# Patient Record
Sex: FEMALE | Race: AMERICAN INDIAN OR ALASKA NATIVE | Employment: OTHER | URBAN - METROPOLITAN AREA
[De-identification: names, ages, dates, MRNs, and addresses within clinical notes are randomized per-mention and may not be internally consistent; named-entity substitution may affect disease eponyms.]

---

## 2018-12-06 PROBLEM — R31.29 OTHER MICROSCOPIC HEMATURIA: Status: ACTIVE | Noted: 2018-12-06

## 2019-06-20 PROBLEM — E55.9 VITAMIN D DEFICIENCY: Status: ACTIVE | Noted: 2017-06-01

## 2019-06-20 PROBLEM — H25.13 AGE-RELATED NUCLEAR CATARACT OF BOTH EYES: Status: ACTIVE | Noted: 2017-11-29

## 2019-06-20 PROBLEM — H01.02B SQUAMOUS BLEPHARITIS OF BOTH UPPER AND LOWER EYELID OF LEFT EYE: Status: ACTIVE | Noted: 2017-07-24

## 2019-06-20 PROBLEM — Z96.1 PSEUDOPHAKIA: Status: ACTIVE | Noted: 2018-08-31

## 2019-06-20 PROBLEM — K76.0 HEPATIC STEATOSIS: Status: ACTIVE | Noted: 2019-05-30

## 2019-06-20 PROBLEM — G56.01 CARPAL TUNNEL SYNDROME OF RIGHT WRIST: Status: ACTIVE | Noted: 2017-07-20

## 2019-06-20 PROBLEM — H00.14 CHALAZION OF LEFT UPPER EYELID: Status: ACTIVE | Noted: 2017-11-29

## 2019-06-20 PROBLEM — M65.341 TRIGGER RING FINGER OF RIGHT HAND: Status: ACTIVE | Noted: 2017-05-04

## 2019-06-20 PROBLEM — H25.11 AGE-RELATED NUCLEAR CATARACT OF RIGHT EYE: Status: ACTIVE | Noted: 2018-08-22

## 2019-06-20 PROBLEM — H25.12 NUCLEAR SCLEROTIC CATARACT OF LEFT EYE: Status: ACTIVE | Noted: 2018-08-22

## 2019-06-20 PROBLEM — D72.829 LEUKOCYTOSIS: Status: ACTIVE | Noted: 2018-08-04

## 2020-06-23 ENCOUNTER — HOSPITAL ENCOUNTER (OUTPATIENT)
Dept: LAB | Age: 75
Discharge: HOME | End: 2020-06-23
Payer: MEDICARE

## 2020-06-23 LAB
HCT VFR BLD AUTO: 38.1 % (ref 35–45)
HGB BLD-MCNC: 12.2 G/DL (ref 12–16)
POTASSIUM SERPL-SCNC: 3.4 MMOL/L (ref 3.5–5.5)

## 2020-06-23 PROCEDURE — 85018 HEMOGLOBIN: CPT

## 2020-06-23 PROCEDURE — 36415 COLL VENOUS BLD VENIPUNCTURE: CPT

## 2020-06-23 PROCEDURE — 84132 ASSAY OF SERUM POTASSIUM: CPT

## 2022-03-14 ENCOUNTER — HOME HEALTH ADMISSION (OUTPATIENT)
Dept: HOME HEALTH SERVICES | Facility: HOME HEALTH | Age: 77
End: 2022-03-14
Payer: MEDICARE

## 2022-03-15 ENCOUNTER — HOME CARE VISIT (OUTPATIENT)
Dept: SCHEDULING | Facility: HOME HEALTH | Age: 77
End: 2022-03-15
Payer: MEDICARE

## 2022-03-15 ENCOUNTER — HOME CARE VISIT (OUTPATIENT)
Dept: HOME HEALTH SERVICES | Facility: HOME HEALTH | Age: 77
End: 2022-03-15
Payer: MEDICARE

## 2022-03-15 PROCEDURE — 3331090001 HH PPS REVENUE CREDIT

## 2022-03-15 PROCEDURE — 400018 HH-NO PAY CLAIM PROCEDURE

## 2022-03-15 PROCEDURE — 3331090002 HH PPS REVENUE DEBIT

## 2022-03-15 PROCEDURE — G0162 HHC RN E&M PLAN SVS, 15 MIN: HCPCS

## 2022-03-15 PROCEDURE — 400013 HH SOC

## 2022-03-15 PROCEDURE — G0299 HHS/HOSPICE OF RN EA 15 MIN: HCPCS

## 2022-03-16 ENCOUNTER — HOME CARE VISIT (OUTPATIENT)
Dept: HOME HEALTH SERVICES | Facility: HOME HEALTH | Age: 77
End: 2022-03-16
Payer: MEDICARE

## 2022-03-16 PROCEDURE — 3331090002 HH PPS REVENUE DEBIT

## 2022-03-16 PROCEDURE — 3331090001 HH PPS REVENUE CREDIT

## 2022-03-17 ENCOUNTER — HOME CARE VISIT (OUTPATIENT)
Dept: SCHEDULING | Facility: HOME HEALTH | Age: 77
End: 2022-03-17
Payer: MEDICARE

## 2022-03-17 ENCOUNTER — HOME CARE VISIT (OUTPATIENT)
Dept: HOME HEALTH SERVICES | Facility: HOME HEALTH | Age: 77
End: 2022-03-17
Payer: MEDICARE

## 2022-03-17 PROCEDURE — 3331090001 HH PPS REVENUE CREDIT

## 2022-03-17 PROCEDURE — 3331090002 HH PPS REVENUE DEBIT

## 2022-03-17 PROCEDURE — G0299 HHS/HOSPICE OF RN EA 15 MIN: HCPCS

## 2022-03-18 VITALS
RESPIRATION RATE: 18 BRPM | OXYGEN SATURATION: 98 % | SYSTOLIC BLOOD PRESSURE: 124 MMHG | TEMPERATURE: 98.3 F | HEART RATE: 77 BPM | DIASTOLIC BLOOD PRESSURE: 60 MMHG

## 2022-03-18 PROBLEM — E55.9 VITAMIN D DEFICIENCY: Status: ACTIVE | Noted: 2017-06-01

## 2022-03-18 PROBLEM — H25.12 NUCLEAR SCLEROTIC CATARACT OF LEFT EYE: Status: ACTIVE | Noted: 2018-08-22

## 2022-03-18 PROBLEM — H25.11 AGE-RELATED NUCLEAR CATARACT OF RIGHT EYE: Status: ACTIVE | Noted: 2018-08-22

## 2022-03-18 PROCEDURE — 3331090001 HH PPS REVENUE CREDIT

## 2022-03-18 PROCEDURE — 3331090002 HH PPS REVENUE DEBIT

## 2022-03-18 NOTE — HOME HEALTH
Skilled services/Home bound verification:     Skilled Reason for admission/summary of clinical condition:  S/P pericardial effusion . This patient is homebound for the following reasons Requires considerable and taxing effort to leave the home , Requires the assistance of 1 or more persons to leave the home  and Only leaves the home for medical reasons or [employer] services and are infrequent and of short duration for other reasons . Caregiver: relative. Caregiver assists with ADL's, house keeping, meal prep. Medications reconciled and all medications are available in the home this visit. The following education was provided regarding medications: All medications reconciled, taught common uses and side effects  Medications  are effective at this time. High risk medication teaching regarding anticoagulants, hyperglycemic agents or opiod narcotics performed (specify) Eliquis:  Instruct patient to tell physicians and dentists that they are taking the drug before elective surgery or invasive procedures and before any new drug is prescribed .  patient to report if they are pregnant or breastfeeding or plan to become pregnant or breastfeed while on drug . Side effects may include nausea, contusion, anemia, syncope, and hemorrhage . Instruct patient to immediately report signs of blood loss or unusual bleeding . Tell patient to immediately report signs of spinal or epidural hematomas (ie, numbness or weakness of legs or bowel or bladder dysfunction) . Advise patient against sudden discontinuation of drug . Advise patient that there are multiple significant drug-drug interactions for this drug. Consult healthcare professional prior to new drug use (including over-the-counter and herbal drugs) .      ,    NO discrepancies/look a like medications/medication interactions     Home health supplies by type and quantity ordered/delivered this visit include: None this visit    Patient education provided this visit to include: [facility] orientation, medications, fall risks, pressure ulcer prevention. Patient level of understanding of education provided: patient acknowledges education    Sharps Education Provided: n/a  Patient response to procedure performed:  Patient was comfortable with entire [facility] visit. No increased discomfort    Home exercise program/Homework provided: Patient will participate with PT/OT    Pt/Caregiver instructed on plan of care and are agreeable to plan of care at this time. Physician Dr. Adonay Torres notified of patient admission to home health and plan of care including anticipated frequency of 2w1, 1w3 and treatments/interventions/modalities of SN. Discharge planning discussed with patient and caregiver. Discharge planning as follows: Will d/c to home/self care when goals are met and patient is stable  . Pt/Caregiver did verbalize understanding of discharge planning. Next MD appointment 3/16/22 (date) with Dr. Adonay Torres. Patient/caregiver encouraged/instructed to keep appointment as lack of follow through with physician appointment could result in discontinuation of home care services for non-compliance.

## 2022-03-19 PROBLEM — Z96.1 PSEUDOPHAKIA: Status: ACTIVE | Noted: 2018-08-31

## 2022-03-19 PROBLEM — H00.14 CHALAZION OF LEFT UPPER EYELID: Status: ACTIVE | Noted: 2017-11-29

## 2022-03-19 PROBLEM — H25.13 AGE-RELATED NUCLEAR CATARACT OF BOTH EYES: Status: ACTIVE | Noted: 2017-11-29

## 2022-03-19 PROBLEM — K76.0 HEPATIC STEATOSIS: Status: ACTIVE | Noted: 2019-05-30

## 2022-03-19 PROBLEM — D72.829 LEUKOCYTOSIS: Status: ACTIVE | Noted: 2018-08-04

## 2022-03-19 PROBLEM — R31.29 OTHER MICROSCOPIC HEMATURIA: Status: ACTIVE | Noted: 2018-12-06

## 2022-03-19 PROBLEM — G56.01 CARPAL TUNNEL SYNDROME OF RIGHT WRIST: Status: ACTIVE | Noted: 2017-07-20

## 2022-03-19 PROBLEM — M65.341 TRIGGER RING FINGER OF RIGHT HAND: Status: ACTIVE | Noted: 2017-05-04

## 2022-03-19 PROBLEM — H01.02B SQUAMOUS BLEPHARITIS OF BOTH UPPER AND LOWER EYELID OF LEFT EYE: Status: ACTIVE | Noted: 2017-07-24

## 2022-03-19 PROCEDURE — 3331090002 HH PPS REVENUE DEBIT

## 2022-03-19 PROCEDURE — 3331090001 HH PPS REVENUE CREDIT

## 2022-03-20 ENCOUNTER — HOME CARE VISIT (OUTPATIENT)
Dept: SCHEDULING | Facility: HOME HEALTH | Age: 77
End: 2022-03-20
Payer: MEDICARE

## 2022-03-20 VITALS
SYSTOLIC BLOOD PRESSURE: 130 MMHG | RESPIRATION RATE: 16 BRPM | OXYGEN SATURATION: 97 % | DIASTOLIC BLOOD PRESSURE: 64 MMHG | TEMPERATURE: 97.9 F | HEART RATE: 74 BPM

## 2022-03-20 PROCEDURE — 3331090001 HH PPS REVENUE CREDIT

## 2022-03-20 PROCEDURE — G0151 HHCP-SERV OF PT,EA 15 MIN: HCPCS

## 2022-03-20 PROCEDURE — 3331090002 HH PPS REVENUE DEBIT

## 2022-03-20 NOTE — HOME HEALTH
S: Patient stated she was very tired and also frustrated at her  for not helping more and being depressed all the time. O: PAIN: Patient reports chronic B knee pain due to arthritis and chronic angina that is worse with stress from her . WOUND:no open wounds noted or reported. ROM: no impairments noted   STRENGTH:Patient demonstrates decreased muscle strength as follows:  R hip flex 3/5  R hip abd 3/5  R hip add 3/5  R hip ext 3/5  R knee flex 3-/5  R knee ext 3-/5  R ankle DF 3/5  L hip flex 3/5  L hip abd 3/5  L hip ext 3/5  L hip add 3/5  L knee flex 3-/5  L knee ext 3-/5  L ankle DF 3/5  FTSTS failed   BED MOBILITY:  Min A   EQUIPMENT:lift chair, 4WW, quad cane, raised commode, shower chair and pt states she has been looking to get a WC ramp. TRANSFERS: patient requires Mod A from lifted chair and uses BUE to push up and significant support of back of legs on chair for support and balance upon standing, has a wide base of support and requires AD for initial standing balance. GAIT: patient ambulating 25 ft with Min A using a [id number]. Patient demonstrates the following gait deficits: slow unsteady gait with decreased foot clearane and step length BLE . Patient requires VC's for standing for a few seconds before walking to decrease fall risk to improve safety and decrease risk of falling. STEPS: there are/is 4 to enter home. Steps not assessed this visit and pt reports significant difficulty with steps and the need for a WC ramp. BALANCE: Tinetti 6/28 and TUG (failed) seconds - high fall risk due to reduced strength, gait deviations and decreased efficiency of balance reactions and pain. Patient demonstrates poor use and activation of ankle and hip strategy during standing balance testing and activities.     A:ASSESSMENT AND PROGRESS TOWARD GOALS:  Patient demonstrated a positive result to therapy this date as evidenced by an improvement in gait pattern with cues, an understanding of her fall risk and agreement to not walk alone,  and patient expressing an understanding of the rehab plan due to therapy verbal and written instructions. Goals established for increased independence in the home, safe mobility in the home, improvement in strength and balance all designed to reduce fall risk and progress toward independence. Patient will benefit from continued PT intervention to progress toward meeting all established goals     P:3W1, 2W3, 1W1      PMH/Summary of clinical condition: Silvia Gupta is a 78 y.o. female referred s/p hospitalization and 2 week rehab stay for pericardial effusion. PMH: A-fib, hypothyroidism, GERD, carpal tunnel syndrome R wrist, chalazion of L upper eyelid, HTN, hepatic stenosis, ischemic heat disease, leukocytosis, asthma, cataract, obesity, psoriasis, psoriatic arthritis, unstable angina pectoris and Vit D deficiency. Medications reconciled. No changes in medications at this time. Medications are effective at this time. Social history/ caregivers: lives with her  in a one level home with 4 steps to enter. She requires assistance from her  for ADL's, IADL's, medication management and transportation. Pt/Caregiver instructed on plan of care and are agreeable to plan of care at this time. Plan of care and admission to home health status reported to attending Gatito Castro MD   Discharge planning discussed with patient and caregiver. Discharge planning as follows: DC to self and family under MD supervision when all goals are met or maximum potential is reached  Pt/Caregiver did verbalize understanding.   Patient education provided this visit: safety, fall risk, HEP, need for assistance at all times with transfers and ambulation  Home exercise program: 1. always have someone with her for assistance when transferring or ambulating   2. stand and walk short distances every hour during the day to increase strength, provide pressure relief and increase independence with transfers  3. Patient/CG instructed in a  HEP as follows: CHF protocol initiated: pursed lipped breathing x 5 reps every hour, ankle pumps x 10 reps every hour and LAQ x 10 2 times daily. Patient's response to treatment: Patient reported knee pain with LAQ and was instructed to do as many as she could without increased knee pain as part of her daily HEP. Patient's response to education provided:  Patient expressed understanding of importance of using her AD at all times to decrease fall risk. Continued need for the following skills: MD referral for HHPT following recent hospital stay. HHPT is medically necessary to address  dx and clinical findings: decreased ROM, decreased strength, impaired gait, decreased ability w stair negotiation, increased swelling, decreased transfer status, decreased endurance, decreased balance and decreased safety, increased pain in order to improve functional mobility/quality of life, decrease burden of care, reduce risk for re-hospitalization, work towards patient's personal goals of return to PLOF w decrease risk for falls.

## 2022-03-20 NOTE — Clinical Note
Pacheco Patrick is a 78 y.o. female referred s/p hospitalization and 2 week rehab stay for pericardial effusion. PMH: A-fib, hypothyroidism, GERD, carpal tunnel syndrome R wrist, chalazion of L upper eyelid, HTN, hepatic stenosis, ischemic heat disease, leukocytosis, asthma, cataract, obesity, psoriasis, psoriatic arthritis, unstable angina pectoris and Vit D deficiency. Plan: 3W1, 2W3, 1W1.    Therapy Functional Score Assessment  Question   Score   Grooming  2       Upper Dressing 2      Lower Dressing 3      Bathing  5      Toilet Transfer  1    Transfer  2            Ambulation  3   Dyspnea                     4       Pain Interfering with activity 3  Est number therapy visits      10

## 2022-03-21 VITALS
TEMPERATURE: 97.4 F | DIASTOLIC BLOOD PRESSURE: 50 MMHG | RESPIRATION RATE: 18 BRPM | HEART RATE: 97 BPM | OXYGEN SATURATION: 98 % | SYSTOLIC BLOOD PRESSURE: 110 MMHG

## 2022-03-21 PROCEDURE — 3331090002 HH PPS REVENUE DEBIT

## 2022-03-21 PROCEDURE — 3331090001 HH PPS REVENUE CREDIT

## 2022-03-21 NOTE — HOME HEALTH
Skilled reason for visit: Assessment, vitals, education    Caregiver involvement: assists with ADl's, housekeeping, medciations, transportations. Medications reviewed and all medications are available in the home this visit. The following education was provided regarding medications:    Lasix:   Drug causes sun-sensitivity. Advise patient to use sunscreen and avoid tanning beds. Patient should avoid activities requiring coordination until drug effects are realized, as drug may cause dizziness, vertigo, or blurred vision. This drug may cause hyperglycemia, hyperuricemia, constipation, diarrhea, loss of appetite, nausea, vomiting, purpuric disorder, cramps, spasticity, asthenia, headache, paresthesia, or scaling eczema. Instruct patient to report unusual bleeding/bruising or signs/symptoms of hypotension, infection, pancreatitis, or ototoxicity (tinnitus, hearing impairment). Advise patient to report signs/symptoms of a severe skin reactions (flu-like symptoms, spreading red rash, or skin/mucous membrane blistering) or erythema multiforme. Instruct patient to eat high-potassium foods during drug therapy, as directed by healthcare professional.   Patient should not drink alcohol while taking this drug. .    MD notified of any discrepancies/look a-like medications/medication interactions: none  Medications are effective at this time.       Home health supplies by type and quantity ordered/delivered this visit include: None this visit    Patient education provided this visit:what to watch for for cellulitis    Sharps education provided: N/A    Patient level of understanding of education provided: [address] Performed this visit: Assessment, education    Patient response to procedure performed: with no increased difficulties    Agency Progress toward goals: progressing    Patient's Progress towards personal goals: progressing    Home exercise program: particpates    Continued need for the following skills: Nursing    Plan for next visit: Assesment, vitals, education    Patient and/or caregiver notified and agrees to changes in the Plan of Care YES      The following discharge planning was discussed with the pt/caregiver:  Will d/c to home/self care when goals are met and patient is stable

## 2022-03-22 ENCOUNTER — HOME CARE VISIT (OUTPATIENT)
Dept: SCHEDULING | Facility: HOME HEALTH | Age: 77
End: 2022-03-22
Payer: MEDICARE

## 2022-03-22 ENCOUNTER — HOME CARE VISIT (OUTPATIENT)
Dept: HOME HEALTH SERVICES | Facility: HOME HEALTH | Age: 77
End: 2022-03-22
Payer: MEDICARE

## 2022-03-22 VITALS
RESPIRATION RATE: 16 BRPM | OXYGEN SATURATION: 97 % | HEART RATE: 87 BPM | TEMPERATURE: 98.2 F | DIASTOLIC BLOOD PRESSURE: 73 MMHG | SYSTOLIC BLOOD PRESSURE: 120 MMHG

## 2022-03-22 VITALS
HEART RATE: 60 BPM | SYSTOLIC BLOOD PRESSURE: 117 MMHG | TEMPERATURE: 96.8 F | OXYGEN SATURATION: 98 % | DIASTOLIC BLOOD PRESSURE: 72 MMHG

## 2022-03-22 PROCEDURE — 3331090001 HH PPS REVENUE CREDIT

## 2022-03-22 PROCEDURE — 3331090002 HH PPS REVENUE DEBIT

## 2022-03-22 PROCEDURE — G0152 HHCP-SERV OF OT,EA 15 MIN: HCPCS

## 2022-03-22 PROCEDURE — G0157 HHC PT ASSISTANT EA 15: HCPCS

## 2022-03-22 NOTE — HOME HEALTH
Summary of clinical condition:   Came in with chest pain and was [facility] and her is summary from then 78year old female with atrial fibrillation who underwent an atrial fibri llation ablation on 8/67, complicated by a large pericardial effusion. She was transferred to [facility] for management. Cardiology performed urgent pericardial drainage. She was in shock and underwent emergency pericardiocentesis. This was not successful and was complicated by RV perforation. She subsequently was seen by CTS and underwent an emergency window procedure. The cause of her pericardial effusion is not totally clear. She had undergone a flutter ablation several days previously, and it was suspected of being a complication. However the pericardial fluid was largely serous which argued against perforation during the original ablation. She developed afib again, despite ablation. Plans were initially made for cardioversion. Because she had been taken off anticoagulation for a period for surgery, a DEWEY was completed first.  Resu lts are below but showed thrombus in the Left atrial appendage. For additional rate control, EP added Dig and she is now on her maintenance dose of .25 daily. Patient was DC [facility] to Rehab on 2/27/2022 but returned to hospital the next day with chest pain. She returned back to Rehab the next day and received OT and PT. She improved and was discharged home on 3/15/2022 with orders for [facility] OT. Medical History: Chronic bilateral knee pain from OA, Left shoulder surgery, Cardiovascular,  Essential hypertension, Chronic deep vein thrombosis (DVT) of femoral vein of left lower extremity ([address] Unstable angina pectoris ([address] Ischemic heart disease, Paroxysmal atrial fibrillation ([address] Stress-induced cardiomyopathy, Bilateral carpel tunnel syndrome. Medications review completed. No adverse reactions noted for.   Pt denied any medication changes since admission. Caregiver involvement: Pt's  assists with dressing, bathing, and cooking. Patient education provided this visit:  Patient was educated about the difference between OT in rehab and in [facility]. Patient Response to Education:  Pt was able to verbalize the difference in OT. Home exercise program:  Pt was trained in HEP including 2 hands clasped and touch neck and bilateral shoulder flexion x 10. She was instructed to perform daily. ASSESSMENT:  Pt has needed assistance for dressing and showering for some time. She does not have or know how to use AE for ADLs. She was instructed in types of AE she needs but she would like to try them with OT before buying them. Pt needs assistance for bed and shower transfers. She has a bed rail and a tub transfer bench but needs further training to be independent. Pt is unable to cook or do her laundry. Pt has decreased UE strength and ROM for ADLs. Pt will be seen for there ex, ADL training, AE training, IADL training, and transfer training. Patient Verbalized Goals:  Improve dressing, showering, and cooking. Patient Response to Evaluation:  Pt reported being tired after evaluation but was not SOB. Continued need for the following skills: Occupational Therapy  Pt/Caregiver instructed on plan of care and are agreeable to plan of care at this time. Discharge planning discussed with patient and caregiver. Discharge planning as follows: Pt will be seen 2 x week x 3 then discharge when goals are met. Scott Nevarez Pt/Caregiver did verbalize understanding.

## 2022-03-22 NOTE — Clinical Note
Patient has long standing ADL problems that can be resolved with AE. She has transfer problems that can be resolved with proper training with AE. She has a leg  and tub bench but does not use them properly. She gets too tired when trying to cook so teach her energy conservation in the kitchen. Any questions just call.

## 2022-03-22 NOTE — HOME HEALTH
SUBJECTIVE: Patient reporting she is bleeding from her vagina in \"blotches\" and this has been happening since patient has been at [facility], [facility] stopped when she left [facility] and started up again recently. Patient has been advised by PCP to get appt with     CAREGIVER INVOLVEMENT/ASSISTANCE NEEDED FOR: patient's  drives and patient has meals on wheels delivered. HOME HEALTH SUPPLIES BY TYPE AND QUANTITY ORDERED/DELIVERED THIS VISIT INCLUDE: NA    OBJECTIVE:  See interventions. PATIENT RESPONSE TO TREATMENT: Patient able to demonstrate therexs as instructed this treatment with some complaints of increased B knee pain. ASSESSMENT OF PROGRESS TOWARD GOALS: Patient progressing towards goals as previously established in POC with home health physical therapy at this time. No noted signs/symptoms of infection today incision site on chest is well approximated and healed. Displayed improving gait form overall using 4ww in the home with cueing to increase trunk extension. Patient was able to complete new standing exercises with some mild fatigue but no reports of increased pain. Plan:  Discharge planning discussed at this visit regarding eventual discharge to outpatient PT services once patient has met goals and met maximum benefit from home health skilled PT services. Continue towards goals per POC as previously established. Continued need for skilled home health PT at this time to address deficits, reduce risk of falls, and obtain goals as previously established per POC.  Continue with outside gait training as per patient request.

## 2022-03-23 ENCOUNTER — HOME CARE VISIT (OUTPATIENT)
Dept: SCHEDULING | Facility: HOME HEALTH | Age: 77
End: 2022-03-23
Payer: MEDICARE

## 2022-03-23 ENCOUNTER — HOME CARE VISIT (OUTPATIENT)
Dept: HOME HEALTH SERVICES | Facility: HOME HEALTH | Age: 77
End: 2022-03-23
Payer: MEDICARE

## 2022-03-23 PROCEDURE — 3331090002 HH PPS REVENUE DEBIT

## 2022-03-23 PROCEDURE — 3331090001 HH PPS REVENUE CREDIT

## 2022-03-23 PROCEDURE — G0156 HHCP-SVS OF AIDE,EA 15 MIN: HCPCS

## 2022-03-24 ENCOUNTER — HOME CARE VISIT (OUTPATIENT)
Dept: SCHEDULING | Facility: HOME HEALTH | Age: 77
End: 2022-03-24
Payer: MEDICARE

## 2022-03-24 VITALS
SYSTOLIC BLOOD PRESSURE: 116 MMHG | TEMPERATURE: 97.1 F | HEART RATE: 68 BPM | OXYGEN SATURATION: 97 % | DIASTOLIC BLOOD PRESSURE: 73 MMHG

## 2022-03-24 PROCEDURE — G0157 HHC PT ASSISTANT EA 15: HCPCS

## 2022-03-24 PROCEDURE — 3331090002 HH PPS REVENUE DEBIT

## 2022-03-24 PROCEDURE — 3331090001 HH PPS REVENUE CREDIT

## 2022-03-24 NOTE — HOME HEALTH
Requested Prescriptions   Pending Prescriptions Disp Refills     ALPRAZolam (XANAX) 0.5 MG tablet 40 tablet 0     Sig: TAKE 1 TABLET BY MOUTH EVERY SIX HOURS AS NEEDED FOR ANXIETY.    There is no refill protocol information for this order        Last Written Prescription Date:  12/31/18  Last Fill Quantity: 40,  # refills: 0   Last office visit: 5/16/2018 with prescribing provider:     Future Office Visit:      Routing refill request to provider for review/approval because:  Drug not on the [facility] refill protocol     Olimpia Oakley RN             SUBJECTIVE: Patient stating her B knees are stiff today due to rain and she is moving slowly. CAREGIVER INVOLVEMENT/ASSISTANCE NEEDED FOR: patient's  take bp, meals on wheels, transportation to MD appts. HOME HEALTH SUPPLIES BY TYPE AND QUANTITY ORDERED/DELIVERED THIS VISIT INCLUDE: NA    OBJECTIVE:  See interventions. PATIENT RESPONSE TO TREATMENT: Some increase in pain with TKR exs although patient does not stop exercises. PATIENT LEVEL OF UNDERSTANDING OF EDUCATION PROVIDED: Patient is able to demonstrate compliance with 8 exs from COPD/CHF protocol as inst prev treatment. ASSESSMENT OF PROGRESS TOWARD GOALS:  Patient progressing towards goals as previously established in POC with home health physical therapy at this time. Family/caregiver involvement are present/set-up at this point in time  assisting as needed. Patient amb in the home small spaces gait form overall. Patient was able to complete new standing exercises with some mild fatigue but no reports of increased pain. Plan:  Discharge planning discussed at this visit regarding eventual discharge to outpatient PT services once patient has met goals and met maximum benefit from home health skilled PT services. Continue towards goals per POC as previously established. Continued need for skilled home health PT at this time to address deficits, reduce risk of falls, and obtain goals as previously established per POC. Continue with therexs to improve strength to alloq ease of gait with 4ww    PLAN FOR NEXT VISIT: continue to instruct in therexs to improve general strengthning to improve quality and ease of gait.     THE FOLLOWING DISCHARGE PLANNING WAS DISCUSSED WITH THE PATIENT/CAREGIVER:DC Planning week of 4-17-22

## 2022-03-25 ENCOUNTER — HOME CARE VISIT (OUTPATIENT)
Dept: SCHEDULING | Facility: HOME HEALTH | Age: 77
End: 2022-03-25
Payer: MEDICARE

## 2022-03-25 ENCOUNTER — HOME CARE VISIT (OUTPATIENT)
Dept: HOME HEALTH SERVICES | Facility: HOME HEALTH | Age: 77
End: 2022-03-25
Payer: MEDICARE

## 2022-03-25 ENCOUNTER — HOSPITAL ENCOUNTER (OUTPATIENT)
Dept: LAB | Age: 77
Discharge: HOME | End: 2022-03-25
Payer: MEDICARE

## 2022-03-25 VITALS
HEART RATE: 61 BPM | DIASTOLIC BLOOD PRESSURE: 61 MMHG | RESPIRATION RATE: 18 BRPM | TEMPERATURE: 97.5 F | SYSTOLIC BLOOD PRESSURE: 92 MMHG | OXYGEN SATURATION: 96 %

## 2022-03-25 LAB
ALBUMIN SERPL-MCNC: 3 G/DL (ref 3.4–5)
ALBUMIN/GLOB SERPL: 0.8 {RATIO} (ref 0.8–1.7)
ALP SERPL-CCNC: 117 U/L (ref 45–117)
ALT SERPL-CCNC: 20 U/L (ref 13–56)
ANION GAP SERPL CALC-SCNC: 8 MMOL/L (ref 3–18)
AST SERPL-CCNC: 16 U/L (ref 10–38)
BASOPHILS # BLD: 0.1 K/UL (ref 0–0.1)
BASOPHILS NFR BLD: 1 % (ref 0–2)
BILIRUB SERPL-MCNC: 0.5 MG/DL (ref 0.2–1)
BUN SERPL-MCNC: 22 MG/DL (ref 7–18)
BUN/CREAT SERPL: 24 (ref 12–20)
CALCIUM SERPL-MCNC: 8.7 MG/DL (ref 8.5–10.1)
CHLORIDE SERPL-SCNC: 96 MMOL/L (ref 100–111)
CO2 SERPL-SCNC: 30 MMOL/L (ref 21–32)
CREAT SERPL-MCNC: 0.93 MG/DL (ref 0.6–1.3)
DIFFERENTIAL METHOD BLD: ABNORMAL
EOSINOPHIL # BLD: 0.2 K/UL (ref 0–0.4)
EOSINOPHIL NFR BLD: 2 % (ref 0–5)
ERYTHROCYTE [DISTWIDTH] IN BLOOD BY AUTOMATED COUNT: 14 % (ref 11.6–14.5)
GLOBULIN SER CALC-MCNC: 3.6 G/DL (ref 2–4)
GLUCOSE SERPL-MCNC: 136 MG/DL (ref 74–99)
HCT VFR BLD AUTO: 37 % (ref 35–45)
HGB BLD-MCNC: 11.6 G/DL (ref 12–16)
IMM GRANULOCYTES # BLD AUTO: 0 K/UL (ref 0–0.04)
IMM GRANULOCYTES NFR BLD AUTO: 0 % (ref 0–0.5)
LYMPHOCYTES # BLD: 2.6 K/UL (ref 0.9–3.6)
LYMPHOCYTES NFR BLD: 26 % (ref 21–52)
MCH RBC QN AUTO: 31.2 PG (ref 24–34)
MCHC RBC AUTO-ENTMCNC: 31.4 G/DL (ref 31–37)
MCV RBC AUTO: 99.5 FL (ref 78–100)
MONOCYTES # BLD: 0.8 K/UL (ref 0.05–1.2)
MONOCYTES NFR BLD: 8 % (ref 3–10)
NEUTS SEG # BLD: 6.5 K/UL (ref 1.8–8)
NEUTS SEG NFR BLD: 64 % (ref 40–73)
NRBC # BLD: 0 K/UL (ref 0–0.01)
NRBC BLD-RTO: 0 PER 100 WBC
PLATELET # BLD AUTO: 334 K/UL (ref 135–420)
PMV BLD AUTO: 9.7 FL (ref 9.2–11.8)
POTASSIUM SERPL-SCNC: 3.6 MMOL/L (ref 3.5–5.5)
PROT SERPL-MCNC: 6.6 G/DL (ref 6.4–8.2)
RBC # BLD AUTO: 3.72 M/UL (ref 4.2–5.3)
SODIUM SERPL-SCNC: 134 MMOL/L (ref 136–145)
WBC # BLD AUTO: 10.1 K/UL (ref 4.6–13.2)

## 2022-03-25 PROCEDURE — 80053 COMPREHEN METABOLIC PANEL: CPT

## 2022-03-25 PROCEDURE — 3331090002 HH PPS REVENUE DEBIT

## 2022-03-25 PROCEDURE — 85025 COMPLETE CBC W/AUTO DIFF WBC: CPT

## 2022-03-25 PROCEDURE — G0299 HHS/HOSPICE OF RN EA 15 MIN: HCPCS

## 2022-03-25 PROCEDURE — G0158 HHC OT ASSISTANT EA 15: HCPCS

## 2022-03-25 PROCEDURE — G0156 HHCP-SVS OF AIDE,EA 15 MIN: HCPCS

## 2022-03-25 PROCEDURE — 3331090001 HH PPS REVENUE CREDIT

## 2022-03-25 NOTE — HOME HEALTH
SUBJECTIVE: Pt reports she is feeling tired following showering ADL  CAREGIVER INVOLVEMENT/ASSISTANCE NEEDED FOR: Pt lives with . PCA provide additional assistance as needed  . OBJECTIVE:  See interventions. PATIENT EDUCATION PROVIDED THIS VISIT: Pt educated on energy conservation techniques during Activities of Daily Living  -Plan ahead to avoid rushing.  -Sit down to bathe and dry off. Wear a lawrence robe instead of drying off.  -Use a shower/bath organizer to decrease leaning and reaching.  -Use extension handles on sponges and brushes.  -Install grab rails in the bathroom or use an elevated toilet seat.  -Lay out clothes and toiletries before dressing.  -Minimize leaning over to put on clothes and shoes. Bring your foot to your knee to apply -socks and shoes. Fasten bra in front then turn to back. -Modify your home to maximize efficient energy use. For example, place chairs strategically to allow for rest stops -- for instance, along a long hallway.  -Wear comfortable shoes and low-heeled, slip on shoes. Wear button front shirts rather than pullovers. PATIENT RESPONSE TO EDUCATION PROVIDED: Pt verbalized understanding and expressed she is implementing mat strategies already  PATIENT RESPONSE TO TREATMENT: Pt had a positive response to treatment with no increased pain or fatigue  . ASSESSMENT OF PROGRESS TOWARD GOALS: Pt is abl to follow UE HEP with Min A for VC for proper form and pacing. Pt requires frequent rest breaks due to limited endurance. Continued OT required for ADL, IADL and functional transfer training  .   PLAN FOR NEXT VISIT: ADL training  THE FOLLOWING DISCHARGE PLANNING WAS DISCUSSED WITH THE PATIENT/CAREGIVER: Discharge to self and family under MD supervision once all goals have been met or patient has reached maximum potential.  Frequency remaining:  2X1

## 2022-03-26 ENCOUNTER — HOME CARE VISIT (OUTPATIENT)
Dept: HOME HEALTH SERVICES | Facility: HOME HEALTH | Age: 77
End: 2022-03-26
Payer: MEDICARE

## 2022-03-26 PROCEDURE — 3331090001 HH PPS REVENUE CREDIT

## 2022-03-26 PROCEDURE — 3331090002 HH PPS REVENUE DEBIT

## 2022-03-26 NOTE — HOME HEALTH
Skilled reason for visit: SN assessment, education      Caregiver involvement:  ADL's, house keeping, transportation, meal prep. .    Medications reviewed and all medications are available in the home this visit. The following education was provided regarding medications:  Levothyroxine:   Advise patients with coronary artery disease to notify a physician or dentist that they take this drug prior to surgical procedures . Side effects may include hyperthyroidism (fatigue, heat intolerance, fever, sweating, hyperactivity, tremors, palpitations, myocardial infarction), pseudotumor cerebri in children (nausea, vomiting, headache, pulsating intracranial sounds), or seizures . Advise patient that improvement of symptoms may not be evident for several weeks . Instruct patient to take on an empty stomach (30 minutes to 1 hour prior to breakfast) . Advise patient against sudden discontinuation of drug . Instruct patient to take 4 hours apart from antacids, iron, and calcium supplements . .    MD notified of any discrepancies/look a-like medications/medication interactions: none  Medications are effective at this time.       Home health supplies by type and quantity ordered/delivered this visit include: none this visit    Patient education provided this visit: Fall prevention, lab work    Sharps education provided: NA    Patient level of understanding of education provided: Patient states that she understands all education      Skilled Care Performed this visit: SN assessment, education      Patient response to procedure performed:  Patient responded well to visit with little  increase in pain or discomfort        Agency Progress toward goals: progressing    Patient's Progress towards personal goals: progressing    Home exercise program: patient participating    Continued need for the following skills: Nursing    Plan for next visit: SN assessment, education      Patient and/or caregiver notified and agrees to changes in the Plan of Care YES      The following discharge planning was discussed with the pt/caregiver: Will d/c to home/self care when goals are met and patient is stable    Blood drawn from [facility] [place] operated by [facility] and taken to lab as ordered.

## 2022-03-27 PROCEDURE — 3331090001 HH PPS REVENUE CREDIT

## 2022-03-27 PROCEDURE — 3331090002 HH PPS REVENUE DEBIT

## 2022-03-28 ENCOUNTER — HOME CARE VISIT (OUTPATIENT)
Dept: SCHEDULING | Facility: HOME HEALTH | Age: 77
End: 2022-03-28
Payer: MEDICARE

## 2022-03-28 VITALS
HEART RATE: 69 BPM | DIASTOLIC BLOOD PRESSURE: 72 MMHG | OXYGEN SATURATION: 96 % | SYSTOLIC BLOOD PRESSURE: 135 MMHG | TEMPERATURE: 96.8 F

## 2022-03-28 LAB
FAX TO INFO,FAXT: NORMAL
FAX TO NUMBER,FAXN: NORMAL

## 2022-03-28 PROCEDURE — G0157 HHC PT ASSISTANT EA 15: HCPCS

## 2022-03-28 PROCEDURE — 3331090002 HH PPS REVENUE DEBIT

## 2022-03-28 PROCEDURE — 3331090001 HH PPS REVENUE CREDIT

## 2022-03-28 NOTE — Clinical Note
SUBJECTIVE: Patient reporting she is urinating alot but is constipated. Patient is still bleeding from her vagina and states it is also in urine at times. Patient does not have an appointment with MD to address this issue as instructed. Patient stating she has been feeling lightheaded today and starting to black out. Patient tries to go lay down or sit down with relief and states she has been through this before. Patient did not have any of these episodes during this treatment.  Patient also stating she has some discomfort at her chest over incision site and other places on her chest.

## 2022-03-28 NOTE — HOME HEALTH
SUBJECTIVE: Patient reporting she is urinating alot but is constipated. Patient is still bleeding from her vagina and states it is also in urine at times. Patient does not have an appointment with MD to address this issue as instructed. Patient stating she has been feeling lightheaded today and starting to black out. Patient tries to go lay down or sit down with relief and states she has been through this before. Patient did not have any of these episodes during this treatment. Patient also stating she has some discomfort at her chest over incision site and other places on her chest.    CAREGIVER INVOLVEMENT/ASSISTANCE NEEDED FOR:  assists with transportation and some meals, patient gets daily meals on wheels. HOME HEALTH SUPPLIES BY TYPE AND QUANTITY ORDERED/DELIVERED THIS VISIT INCLUDE: NA    OBJECTIVE:  See interventions. PATIENT RESPONSE TO TREATMENT: patient has difficulty with knee exs seated and standing due to cronic pain    PATIENT LEVEL OF UNDERSTANDING OF EDUCATION PROVIDED: pt demonstrating compliance with COPD/CHF exs with min cueing for technique    ASSESSMENT OF PROGRESS TOWARD GOALS:  Patient progressing towards goals as previously established in POC with home health physical therapy at this time. Family/caregiver involvement are present/set-up at this point in time with  providing transportation and some meal prep. Patient was able to complete seated, standing LE and UE  exercises with some mild fatigue but no reports of increased pain. Several breif rest breaks throughout therexs. Plan:  Discharge planning discussed at this visit. Continue towards goals per POC as previously established. Continued need for skilled home health PT at this time to address deficits, reduce risk of falls, and obtain goals as previously established per POC. Continue with gait training, stair training, instruct in therexs to improve strength and ROM.     CONTINUED NEED FOR THE FOLLOWING SKILLS: PT instruct in therexs to improve strength, ROM for improved ease and quality of gait with 4WW. PLAN FOR NEXT VISIT:Plan stair training upcoming treatments as patient declines this treatment. Encouraged patient to be compliant with participating  in therapy treatments. THE FOLLOWING DISCHARGE PLANNING WAS DISCUSSED WITH THE PATIENT/CAREGIVER:DC planning 3 weeks.  Patient to demonstrate compliance with HEP

## 2022-03-29 ENCOUNTER — HOME CARE VISIT (OUTPATIENT)
Dept: SCHEDULING | Facility: HOME HEALTH | Age: 77
End: 2022-03-29
Payer: MEDICARE

## 2022-03-29 ENCOUNTER — HOME CARE VISIT (OUTPATIENT)
Dept: HOME HEALTH SERVICES | Facility: HOME HEALTH | Age: 77
End: 2022-03-29
Payer: MEDICARE

## 2022-03-29 VITALS
OXYGEN SATURATION: 99 % | DIASTOLIC BLOOD PRESSURE: 64 MMHG | HEART RATE: 62 BPM | RESPIRATION RATE: 18 BRPM | TEMPERATURE: 97.8 F | SYSTOLIC BLOOD PRESSURE: 112 MMHG

## 2022-03-29 VITALS — HEART RATE: 62 BPM | DIASTOLIC BLOOD PRESSURE: 59 MMHG | OXYGEN SATURATION: 95 % | SYSTOLIC BLOOD PRESSURE: 121 MMHG

## 2022-03-29 PROCEDURE — G0158 HHC OT ASSISTANT EA 15: HCPCS

## 2022-03-29 PROCEDURE — 3331090002 HH PPS REVENUE DEBIT

## 2022-03-29 PROCEDURE — 3331090001 HH PPS REVENUE CREDIT

## 2022-03-30 PROCEDURE — 3331090002 HH PPS REVENUE DEBIT

## 2022-03-30 PROCEDURE — 3331090001 HH PPS REVENUE CREDIT

## 2022-03-30 NOTE — HOME HEALTH
SUBJECTIVE: Pt reports having continued dizziness over night. Pt has informed MD  CAREGIVER INVOLVEMENT/ASSISTANCE NEEDED FOR: Pt lives with  who provides assistance as needed with ADLs/IADLs as needed  . OBJECTIVE:  See interventions. PATIENT EDUCATION PROVIDED THIS VISIT: Pt educated on energy conservation techniques such as sitting to complete bathing task and standing for manish care to conserve energy and decrease risks of falls  PATIENT RESPONSE TO EDUCATION PROVIDED: Pt employed energy conservation techniques and verbalized understanding  PATIENT RESPONSE TO TREATMENT: Pt had a positive response to treatment. Pt able to tolerate task without complaints of dizziness  . ASSESSMENT OF PROGRESS TOWARD GOALS: Pt able to complete showering ADL with Min A Upper body bathing and Moderate A for Lower body bathing. Pt required assistance to wash distal BLE due to limited mobility in LE. Pt unable to reach distal BLE with use of long handled sponge. Pt require Mod A for LB dressing to thread BLE due to impaired balance and limited forward functional reach  .   PLAN FOR NEXT VISIT: Dressing ADL training with use of  AE  THE FOLLOWING DISCHARGE PLANNING WAS DISCUSSED WITH THE PATIENT/CAREGIVER: Discharge to self and family under MD supervision once all goals have been met or patient has reached maximum potential.  Frequency remaining: , 1X1, 2X1

## 2022-03-31 ENCOUNTER — HOME CARE VISIT (OUTPATIENT)
Dept: SCHEDULING | Facility: HOME HEALTH | Age: 77
End: 2022-03-31
Payer: MEDICARE

## 2022-03-31 VITALS
DIASTOLIC BLOOD PRESSURE: 70 MMHG | SYSTOLIC BLOOD PRESSURE: 138 MMHG | OXYGEN SATURATION: 95 % | RESPIRATION RATE: 18 BRPM | TEMPERATURE: 97 F | HEART RATE: 67 BPM

## 2022-03-31 PROCEDURE — G0158 HHC OT ASSISTANT EA 15: HCPCS

## 2022-03-31 PROCEDURE — 3331090001 HH PPS REVENUE CREDIT

## 2022-03-31 PROCEDURE — G0156 HHCP-SVS OF AIDE,EA 15 MIN: HCPCS

## 2022-03-31 PROCEDURE — G0157 HHC PT ASSISTANT EA 15: HCPCS

## 2022-03-31 PROCEDURE — G0299 HHS/HOSPICE OF RN EA 15 MIN: HCPCS

## 2022-03-31 PROCEDURE — 3331090002 HH PPS REVENUE DEBIT

## 2022-03-31 NOTE — HOME HEALTH
SUBJECTIVE: Pt reports she is feeling tired follow showr with [facility]  . CAREGIVER INVOLVEMENT/ASSISTANCE NEEDED FOR: Pthusband assists withADLs/IADLs as needed   . HOME HEALTH SUPPLIES BY TYPE AND QUANTITY ORDERED/DELIVERED THIS VISIT INCLUDE: NONE   . OBJECTIVE:  See interventions. .  Patient education provided this visit:  Pt educated on energy conservation techniques during meal prep. BARR provided education on   . Patient level of understanding of education provided: Pt in agreeance to education provided by MOMO. Dang Hoyt RESPONSE TO TREATMENT:Pt had a positive responce to tratment with no complaints of increase pain or fatige  . ASSESSMENT OF PROGRESS TOWARD GOALS: Pt able to complete meal prep ADL wuithsupervision. Pt required supervison for cues for task simplification to maximze pt indeepennce with meal prep task. t able to demonstate lower hossein dressing Mod I with use of reacher. .  CONTINUED NEED FOR THE FOLLOWING SKILLS: HH OT is medically necessary to address  decreased functional strength, decreased independence and safety with functional transfers, decreased independence and safety performing ADL/IADL tasks, decreased activity and standing tolerance, decreased functional endurance, and impaired balance in order to improve functional independence, obtain set goals, reduce risk of falls,  improve quality of life, and return to PLOF. Dang Hoyt PLAN FOR NEXT VISIT:BARR will address IADLtraining  .    THE FOLLOWING DISCHARGE PLANNING WAS DISCUSSED WITH THE PATIENT/CAREGIVER: 2X1 with pt to discharge when goals are met or maximal potential. Pt made aware and

## 2022-04-01 VITALS
RESPIRATION RATE: 18 BRPM | SYSTOLIC BLOOD PRESSURE: 132 MMHG | OXYGEN SATURATION: 98 % | DIASTOLIC BLOOD PRESSURE: 60 MMHG | TEMPERATURE: 97 F | HEART RATE: 70 BPM

## 2022-04-01 VITALS
OXYGEN SATURATION: 96 % | HEART RATE: 61 BPM | DIASTOLIC BLOOD PRESSURE: 58 MMHG | SYSTOLIC BLOOD PRESSURE: 116 MMHG | TEMPERATURE: 97.3 F

## 2022-04-01 PROCEDURE — 3331090002 HH PPS REVENUE DEBIT

## 2022-04-01 PROCEDURE — 3331090001 HH PPS REVENUE CREDIT

## 2022-04-01 NOTE — HOME HEALTH
SUBJECTIVE: I am hurting more, been a busy day. CAREGIVER INVOLVEMENT/ASSISTANCE NEEDED FOR: Sheila, , assist with cooking  [address] ORDERED/DELIVERED THIS VISIT INCLUDE: none  OBJECTIVE:  See interventions. ASSESSMENT OF PROGRESS TOWARD GOALS: Pt working toward transfers and ambulation, but fatigues, needing rest breaks. PATIENT RESPONSE TO TREATMENT:  Pain increased from 2 to 4 with ther-ex  PATIENT LEVEL OF UNDERSTANDING OF EDUCATION PROVIDED: Pt followed ther-ex, but not able to repeat any for HEP. Written HEP provided  CONTINUED NEED FOR THE FOLLOWING SKILLS:  CONT PT to address MMT, balance, endurance, transfers, bed mob, gait traniing and other goals  PLAN FOR NEXT VISIT: Progress COPD as endurance and pain allows. THE FOLLOWING DISCHARGE PLANNING WAS DISCUSSED WITH THE PATIENT/CAREGIVER: 2wk2,1wk1 DC when goals met.

## 2022-04-02 PROCEDURE — 3331090001 HH PPS REVENUE CREDIT

## 2022-04-02 PROCEDURE — 3331090002 HH PPS REVENUE DEBIT

## 2022-04-03 PROCEDURE — 3331090002 HH PPS REVENUE DEBIT

## 2022-04-03 PROCEDURE — 3331090001 HH PPS REVENUE CREDIT

## 2022-04-04 PROCEDURE — 3331090001 HH PPS REVENUE CREDIT

## 2022-04-04 PROCEDURE — 3331090002 HH PPS REVENUE DEBIT

## 2022-04-04 NOTE — HOME HEALTH
Skilled reason for visit: SN assessment, education      Caregiver involvement:  ADL's, house keeping, transportation, meal prep. .    Medications reviewed and all medications are available in the home this visit. The following education was provided regarding medications:  Levothyroxine:   Advise patients with coronary artery disease to notify a physician or dentist that they take this drug prior to surgical procedures . Side effects may include hyperthyroidism (fatigue, heat intolerance, fever, sweating, hyperactivity, tremors, palpitations, myocardial infarction), pseudotumor cerebri in children (nausea, vomiting, headache, pulsating intracranial sounds), or seizures . Advise patient that improvement of symptoms may not be evident for several weeks . Instruct patient to take on an empty stomach (30 minutes to 1 hour prior to breakfast) . Advise patient against sudden discontinuation of drug . Instruct patient to take 4 hours apart from antacids, iron, and calcium supplements . .    MD notified of any discrepancies/look a-like medications/medication interactions: none    Medications are effective at this time.       Home health supplies by type and quantity ordered/delivered this visit include: none this visit    Patient education provided this visit: fall prevention, pressure ulcer prevention, pain contol    Sharps education provided: Clinician instructed patient/CG on proper disposal of sharps: Containers should be made of hard plastic, be puncture-resistant and leakproof,   such as a laundry detergent or bleach bottle.  When the container is ¾ full, it should be sealed with tape and labeled   DO NOT RECYCLE prior to discarding in the regular trash.        Patient level of understanding of education provided: Patient states that she understands all education    Skilled Care Performed this visit: SN assessment, education      Patient response to procedure performed:  Patient responded well to visit without any increase in pain or discomfort      Agency Progress toward goals: progressing    Patient's Progress towards personal goals: progressing    Home exercise program: participating with PT    Continued need for the following skills: Nursing    Plan for next visit: SN assessment, education      Patient and/or caregiver notified and agrees to changes in the Plan of Care YES      The following discharge planning was discussed with the pt/caregiver:  Will d/c to home/self care when goals are met and patient is stable

## 2022-04-05 ENCOUNTER — HOME CARE VISIT (OUTPATIENT)
Dept: SCHEDULING | Facility: HOME HEALTH | Age: 77
End: 2022-04-05
Payer: MEDICARE

## 2022-04-05 PROCEDURE — G0156 HHCP-SVS OF AIDE,EA 15 MIN: HCPCS

## 2022-04-05 PROCEDURE — 3331090001 HH PPS REVENUE CREDIT

## 2022-04-05 PROCEDURE — G0157 HHC PT ASSISTANT EA 15: HCPCS

## 2022-04-05 PROCEDURE — G0152 HHCP-SERV OF OT,EA 15 MIN: HCPCS

## 2022-04-05 PROCEDURE — 3331090002 HH PPS REVENUE DEBIT

## 2022-04-06 VITALS
TEMPERATURE: 97.3 F | DIASTOLIC BLOOD PRESSURE: 80 MMHG | SYSTOLIC BLOOD PRESSURE: 130 MMHG | OXYGEN SATURATION: 97 % | HEART RATE: 63 BPM

## 2022-04-06 VITALS
DIASTOLIC BLOOD PRESSURE: 60 MMHG | TEMPERATURE: 98.2 F | RESPIRATION RATE: 16 BRPM | HEART RATE: 64 BPM | SYSTOLIC BLOOD PRESSURE: 118 MMHG | OXYGEN SATURATION: 98 %

## 2022-04-06 PROCEDURE — 3331090002 HH PPS REVENUE DEBIT

## 2022-04-06 PROCEDURE — 3331090001 HH PPS REVENUE CREDIT

## 2022-04-06 NOTE — HOME HEALTH
SUBJECTIVE: Pt said she saw her cardiologist today and he wants to see her next day. CAREGIVER INVOLVEMENT/ASSISTANCE NEEDED FOR: Pt's  assists with ADLs and IADLs. OBJECTIVE:  See interventions. PATIENT RESPONSE TO TREATMENT:  Pt verbalized pleasure at being able to dress her LE by herself. She did not have SOB with dressing. PATIENT LEVEL OF UNDERSTANDING OF EDUCATION PROVIDED: Pt repeated back the type of AE I recommended and the energy conservation techniques she will use. ASSESSMENT OF PROGRESS TOWARD GOALS: Pt no longer wants to do UE exercises because she does them for PT. UE functioning goal is discontinued per her request.  She has improved in UE and LE dressing and is now Mod independent for dressing. She has improved in transfers. She is now Mod independent for bed, chair, commode, and tub transfers. She now understands energy conservation concepts. CONTINUED NEED FOR THE FOLLOWING SKILLS: Pt continues to have decreased ability to perform meal preporation. PLAN FOR NEXT VISIT: Pt will be seen one more visit for IADL training then discharge. THE FOLLOWING DISCHARGE PLANNING WAS DISCUSSED WITH THE PATIENT/CAREGIVER: Pt will be seen one more visit then discharge to community.

## 2022-04-06 NOTE — HOME HEALTH
SUBJECTIVE: My R shoulder and knees hurt  CAREGIVER INVOLVEMENT/ASSISTANCE NEEDED FOR: Sheila, , assist with household chore  [address] ORDERED/DELIVERED THIS VISIT INCLUDE: none  OBJECTIVE:  See interventions. ASSESSMENT OF PROGRESS TOWARD GOALS: Pt hesistant to perform many standing ther-ex, needed consistent VC and encouragement to perform. PATIENT RESPONSE TO TREATMENT:  Pt reported constant pain and resisted performing without encouragement. PATIENT LEVEL OF UNDERSTANDING OF EDUCATION PROVIDED: Pt understands need to perform HEP, but not willing to perform all exercises all the time. CONTINUED NEED FOR THE FOLLOWING SKILLS:  CONT PT to address MMT, balance, endurance, transfers, bed mob, gait traniing and other goals  PLAN FOR NEXT VISIT: Attempt stairs to allow patient to MD appt if pateint allows. THE FOLLOWING DISCHARGE PLANNING WAS DISCUSSED WITH THE PATIENT/CAREGIVER: 1wk1, 2wk1,1wk1 DC when goals met.

## 2022-04-07 ENCOUNTER — HOME CARE VISIT (OUTPATIENT)
Dept: SCHEDULING | Facility: HOME HEALTH | Age: 77
End: 2022-04-07
Payer: MEDICARE

## 2022-04-07 ENCOUNTER — HOME CARE VISIT (OUTPATIENT)
Dept: HOME HEALTH SERVICES | Facility: HOME HEALTH | Age: 77
End: 2022-04-07
Payer: MEDICARE

## 2022-04-07 PROCEDURE — G0152 HHCP-SERV OF OT,EA 15 MIN: HCPCS

## 2022-04-07 PROCEDURE — 3331090001 HH PPS REVENUE CREDIT

## 2022-04-07 PROCEDURE — G0157 HHC PT ASSISTANT EA 15: HCPCS

## 2022-04-07 PROCEDURE — G0299 HHS/HOSPICE OF RN EA 15 MIN: HCPCS

## 2022-04-07 PROCEDURE — 3331090002 HH PPS REVENUE DEBIT

## 2022-04-07 NOTE — Clinical Note
ASSESSMENT OF PROGRESS TOWARD GOALS: Pt has improved her ability to dress and shower and is now Mod independent for both. She has improved in bed, commode, and shower transfer and is now Mod independent for transfers. She uses the electric lift to stand from her recliner. Pt has improved her ability to fix meals. She has meals on wheels for her main meal and is now able to fix simple breakfast and dinner. Pt changed her mid about UE treatment and did not improve. Pt has met her goals and is discharged from OT services. Pt agrees to DC and her PCP has been notified.

## 2022-04-08 VITALS
SYSTOLIC BLOOD PRESSURE: 130 MMHG | OXYGEN SATURATION: 92 % | DIASTOLIC BLOOD PRESSURE: 70 MMHG | HEART RATE: 70 BPM | TEMPERATURE: 98.1 F | RESPIRATION RATE: 18 BRPM

## 2022-04-08 VITALS
TEMPERATURE: 98.2 F | SYSTOLIC BLOOD PRESSURE: 116 MMHG | OXYGEN SATURATION: 97 % | DIASTOLIC BLOOD PRESSURE: 73 MMHG | RESPIRATION RATE: 16 BRPM | HEART RATE: 62 BPM

## 2022-04-08 PROCEDURE — 3331090001 HH PPS REVENUE CREDIT

## 2022-04-08 PROCEDURE — 3331090002 HH PPS REVENUE DEBIT

## 2022-04-08 NOTE — HOME HEALTH
SUBJECTIVE: Pt said she saw her cardio surgeon today and she increased the furosemide to 80 mg daily. CAREGIVER INVOLVEMENT/ASSISTANCE NEEDED FOR: Pt's  assists with IADLs as needed. OBJECTIVE:  See interventions. PATIENT RESPONSE TO TREATMENT:  Pt C/O pain in her knees after working in the kitchen. She reported pain back to baseline after sitting 5 minutes. PATIENT LEVEL OF UNDERSTANDING OF EDUCATION PROVIDED: Pt demonstrated energy conservation techniques when working in the kitchen. ASSESSMENT OF PROGRESS TOWARD GOALS: Pt has improved her ability to dress and shower and is now Mod independent for both. She has improved in bed, commode, and shower transfer and is now Mod independent for transfers. She uses the electric lift to stand from her recliner. Pt has improved her ability to fix meals. She has meals on wheels for her main meal and is now able to fix simple breakfast and dinner. Pt changed her mid about UE treatment and did not improve. Pt has met her goals and is discharged from OT services. Pt agrees to DC and her PCP has been notified.

## 2022-04-08 NOTE — HOME HEALTH
SUBJECTIVE: Pain is still high, got to take somethin  CAREGIVER INVOLVEMENT/ASSISTANCE NEEDED FOR: Sheila, , assist with meal prep  HOME HEALTH SUPPLIES BY TYPE AND QUANTITY ORDERED/DELIVERED THIS VISIT INCLUDE: none  OBJECTIVE:  See interventions. ASSESSMENT OF PROGRESS TOWARD GOALS: Pt progressed to perform stair training, but not needed to use bilat UE on railing instead of cane. PATIENT RESPONSE TO TREATMENT:  Pt SOB with stair training, needing rest of 2 min afterwards   PATIENT LEVEL OF UNDERSTANDING OF EDUCATION PROVIDED: Pt educated on transfers from [place] [id number], pain in chest increased, Pt able to repeat instructions, but not going to perform unless required due to pain. CONTINUED NEED FOR THE FOLLOWING SKILLS:  CONT PT to address MMT, balance, endurance, transfers, bed mob, gait traniing and other goals  PLAN FOR NEXT VISIT: Progress with gait training and increase COPD reps as endurance and pain allows. THE FOLLOWING DISCHARGE PLANNING WAS DISCUSSED WITH THE PATIENT/CAREGIVER: 2wk1,1wk1 DC when goals met.

## 2022-04-09 PROCEDURE — 3331090001 HH PPS REVENUE CREDIT

## 2022-04-09 PROCEDURE — 3331090002 HH PPS REVENUE DEBIT

## 2022-04-10 PROCEDURE — 3331090001 HH PPS REVENUE CREDIT

## 2022-04-10 PROCEDURE — 3331090002 HH PPS REVENUE DEBIT

## 2022-04-11 PROCEDURE — 3331090001 HH PPS REVENUE CREDIT

## 2022-04-11 PROCEDURE — 3331090002 HH PPS REVENUE DEBIT

## 2022-04-11 NOTE — HOME HEALTH
Skilled reason for visit: SN assessment, education      Caregiver involvement:  ADL's, house keeping, transportation, meal prep. .    Medications reviewed and all medications are not available in the home this visit. The following education was provided regarding medications: All medications educated on  and reconciled. All medications prescribed are in the home and patient taking as prescribed. Common uses and side effects reviewed with patient  . MD notified of any discrepancies/look a-like medications/medication interactions: none  Medications are effective at this time.       Home health supplies by type and quantity ordered/delivered this visit include: none this visit    Patient education provided this visit: disease process education    Sharps education provided: Clinician instructed patient/CG on proper disposal of sharps: Containers should be made of hard plastic, be puncture-resistant and leakproof,   such as a laundry detergent or bleach bottle.  When the container is ¾ full, it should be sealed with tape and labeled   DO NOT RECYCLE prior to discarding in the regular trash.        Patient level of understanding of education provided: Patient states that she understands all education      Skilled Care Performed this visit: SN assessment, education      Patient response to procedure performed:  Patient responded well to visit without any increase in pain or discomfort        Agency Progress toward goals: progressing    Patient's Progress towards personal goals: progressing    Home exercise program: participates with PT    Continued need for the following skills: Nursing    Plan for next visit: Patient is discharged from  nursing this visit, lorena continue with PT    Patient and/or caregiver notified and agrees to changes in the Plan of Care YES      The following discharge planning was discussed with the pt/caregiver: Patient is discharged to home/self care from [facility] but continues with PT

## 2022-04-12 ENCOUNTER — HOME CARE VISIT (OUTPATIENT)
Dept: SCHEDULING | Facility: HOME HEALTH | Age: 77
End: 2022-04-12
Payer: MEDICARE

## 2022-04-12 PROCEDURE — 3331090001 HH PPS REVENUE CREDIT

## 2022-04-12 PROCEDURE — G0157 HHC PT ASSISTANT EA 15: HCPCS

## 2022-04-12 PROCEDURE — 3331090002 HH PPS REVENUE DEBIT

## 2022-04-13 VITALS
TEMPERATURE: 98.2 F | HEART RATE: 63 BPM | OXYGEN SATURATION: 94 % | DIASTOLIC BLOOD PRESSURE: 64 MMHG | SYSTOLIC BLOOD PRESSURE: 122 MMHG

## 2022-04-13 PROCEDURE — 3331090001 HH PPS REVENUE CREDIT

## 2022-04-13 PROCEDURE — 3331090003 HH PPS REVENUE ADJ

## 2022-04-13 PROCEDURE — 3331090002 HH PPS REVENUE DEBIT

## 2022-04-13 NOTE — HOME HEALTH
SUBJECTIVE: Pt says she is about the same  CAREGIVER INVOLVEMENT/ASSISTANCE NEEDED FOR: Sheila, , assist with meal prep  HOME HEALTH SUPPLIES BY TYPE AND QUANTITY ORDERED/DELIVERED THIS VISIT INCLUDE: none  OBJECTIVE:  See interventions. ASSESSMENT OF PROGRESS TOWARD GOALS: Pt improving transfers from chair   PATIENT RESPONSE TO TREATMENT:  Pt pain increased from 3-5 in L knee with exercises. PATIENT LEVEL OF UNDERSTANDING OF EDUCATION PROVIDED: Pt educated on stair training exiting house. CONTINUED NEED FOR THE FOLLOWING SKILLS:  CONT PT to address MMT, balance, endurance, transfers, bed mob, gait traniing and other goals  PLAN FOR NEXT VISIT: Progress gait training to allow for transfer to car. THE FOLLOWING DISCHARGE PLANNING WAS DISCUSSED WITH THE PATIENT/CAREGIVER: 1wk1,1wk1 DC when goals met.

## 2022-04-14 ENCOUNTER — HOME CARE VISIT (OUTPATIENT)
Dept: SCHEDULING | Facility: HOME HEALTH | Age: 77
End: 2022-04-14
Payer: MEDICARE

## 2022-04-14 PROCEDURE — 400013 HH SOC

## 2022-04-14 PROCEDURE — G0157 HHC PT ASSISTANT EA 15: HCPCS

## 2022-04-14 PROCEDURE — 3331090002 HH PPS REVENUE DEBIT

## 2022-04-14 PROCEDURE — 3331090001 HH PPS REVENUE CREDIT

## 2022-04-15 PROCEDURE — 3331090002 HH PPS REVENUE DEBIT

## 2022-04-15 PROCEDURE — 3331090001 HH PPS REVENUE CREDIT

## 2022-04-15 NOTE — HOME HEALTH
SUBJECTIVE: Pt says she has hives, but is improving after seeing MD about it  CAREGIVER INVOLVEMENT/ASSISTANCE NEEDED FOR: Sheila, , assist with meal prep  [address] ORDERED/DELIVERED THIS VISIT INCLUDE: none  OBJECTIVE:  See interventions. ASSESSMENT OF PROGRESS TOWARD GOALS: Pt improved transfers to mod indep, increased gait training to 300 ft, improved pain to 3/10, able to amb outside. PATIENT RESPONSE TO TREATMENT:  Pt SOB after ambulaiton, but recovered in under 1 min.   PATIENT LEVEL OF UNDERSTANDING OF EDUCATION PROVIDED: Pt understood DC instructions and willing to follow, able to repeat back  PLAN FOR NEXT VISIT: DC next visit  THE FOLLOWING DISCHARGE PLANNING WAS DISCUSSED WITH THE PATIENT/CAREGIVER: DC next visit

## 2022-04-16 PROCEDURE — 3331090001 HH PPS REVENUE CREDIT

## 2022-04-16 PROCEDURE — 3331090002 HH PPS REVENUE DEBIT

## 2022-04-17 PROCEDURE — 3331090001 HH PPS REVENUE CREDIT

## 2022-04-17 PROCEDURE — 3331090002 HH PPS REVENUE DEBIT

## 2022-04-18 PROCEDURE — 3331090002 HH PPS REVENUE DEBIT

## 2022-04-18 PROCEDURE — 3331090001 HH PPS REVENUE CREDIT

## 2022-04-19 PROCEDURE — 3331090001 HH PPS REVENUE CREDIT

## 2022-04-19 PROCEDURE — 3331090002 HH PPS REVENUE DEBIT

## 2022-04-20 PROCEDURE — 3331090002 HH PPS REVENUE DEBIT

## 2022-04-20 PROCEDURE — 3331090001 HH PPS REVENUE CREDIT

## 2022-04-21 ENCOUNTER — HOME CARE VISIT (OUTPATIENT)
Dept: SCHEDULING | Facility: HOME HEALTH | Age: 77
End: 2022-04-21
Payer: MEDICARE

## 2022-04-21 PROCEDURE — 3331090002 HH PPS REVENUE DEBIT

## 2022-04-21 PROCEDURE — 3331090001 HH PPS REVENUE CREDIT

## 2022-04-21 NOTE — Clinical Note
Kaylee Mohr received skilled PT, OT and RN s/p hospitalization and rehab stay for paracardial effusion. She was seen for a total of 9 PT visits and was scheduled for discharge this week but unable to accommodate a discharge visit either Thursday or Friday at any time due to shopping and hair appointments. Patient is apparently not homebound at this time and will be discharged with a non-billable visit this date.

## 2022-04-22 PROCEDURE — 3331090002 HH PPS REVENUE DEBIT

## 2022-04-22 PROCEDURE — 3331090001 HH PPS REVENUE CREDIT

## 2022-04-22 NOTE — HOME HEALTH
Vira Graff received skilled PT, OT and RN s/p hospitalization and rehab stay for paracardial effusion. She was seen for a total of 9 PT visits and was scheduled for discharge this week but unable to accommodate a discharge visit either Thursday or Friday at any time due to shopping and hair appointments. Patient is apparently not homebound at this time and will be discharged with a non-billable visit this date.

## 2022-04-23 PROCEDURE — 3331090001 HH PPS REVENUE CREDIT

## 2022-04-23 PROCEDURE — 3331090002 HH PPS REVENUE DEBIT

## 2022-04-24 PROCEDURE — 3331090001 HH PPS REVENUE CREDIT

## 2022-04-24 PROCEDURE — 3331090002 HH PPS REVENUE DEBIT

## 2022-04-25 PROCEDURE — 3331090002 HH PPS REVENUE DEBIT

## 2022-04-25 PROCEDURE — 3331090001 HH PPS REVENUE CREDIT

## 2022-04-26 PROCEDURE — 3331090001 HH PPS REVENUE CREDIT

## 2022-04-26 PROCEDURE — 3331090002 HH PPS REVENUE DEBIT

## 2022-04-27 PROCEDURE — 3331090001 HH PPS REVENUE CREDIT

## 2022-04-27 PROCEDURE — 3331090002 HH PPS REVENUE DEBIT

## 2022-04-28 PROCEDURE — 3331090002 HH PPS REVENUE DEBIT

## 2022-04-28 PROCEDURE — 3331090001 HH PPS REVENUE CREDIT

## 2022-04-29 PROCEDURE — 3331090002 HH PPS REVENUE DEBIT

## 2022-04-29 PROCEDURE — 3331090001 HH PPS REVENUE CREDIT

## 2022-04-30 PROCEDURE — 3331090002 HH PPS REVENUE DEBIT

## 2022-04-30 PROCEDURE — 3331090001 HH PPS REVENUE CREDIT

## 2022-05-01 PROCEDURE — 3331090001 HH PPS REVENUE CREDIT

## 2022-05-01 PROCEDURE — 3331090002 HH PPS REVENUE DEBIT

## 2022-05-02 PROCEDURE — 3331090002 HH PPS REVENUE DEBIT

## 2022-05-02 PROCEDURE — 3331090001 HH PPS REVENUE CREDIT

## 2022-05-03 PROCEDURE — 3331090002 HH PPS REVENUE DEBIT

## 2022-05-03 PROCEDURE — 3331090001 HH PPS REVENUE CREDIT

## 2022-05-04 PROCEDURE — 3331090001 HH PPS REVENUE CREDIT

## 2022-05-04 PROCEDURE — 3331090002 HH PPS REVENUE DEBIT

## 2022-05-05 PROCEDURE — 3331090002 HH PPS REVENUE DEBIT

## 2022-05-05 PROCEDURE — 3331090001 HH PPS REVENUE CREDIT

## 2022-05-06 PROCEDURE — 3331090001 HH PPS REVENUE CREDIT

## 2022-05-06 PROCEDURE — 3331090002 HH PPS REVENUE DEBIT

## 2022-05-07 PROCEDURE — 3331090002 HH PPS REVENUE DEBIT

## 2022-05-07 PROCEDURE — 3331090001 HH PPS REVENUE CREDIT

## 2022-05-08 PROCEDURE — 3331090002 HH PPS REVENUE DEBIT

## 2022-05-08 PROCEDURE — 3331090001 HH PPS REVENUE CREDIT

## 2022-05-09 PROCEDURE — 3331090001 HH PPS REVENUE CREDIT

## 2022-05-09 PROCEDURE — 3331090002 HH PPS REVENUE DEBIT

## 2022-05-10 PROCEDURE — 3331090001 HH PPS REVENUE CREDIT

## 2022-05-10 PROCEDURE — 3331090002 HH PPS REVENUE DEBIT

## 2022-05-11 PROCEDURE — 3331090002 HH PPS REVENUE DEBIT

## 2022-05-11 PROCEDURE — 3331090001 HH PPS REVENUE CREDIT

## 2022-05-12 PROCEDURE — 3331090002 HH PPS REVENUE DEBIT

## 2022-05-12 PROCEDURE — 3331090001 HH PPS REVENUE CREDIT

## 2022-05-13 PROCEDURE — 3331090003 HH PPS REVENUE ADJ

## 2022-05-13 PROCEDURE — 3331090001 HH PPS REVENUE CREDIT

## 2022-05-13 PROCEDURE — 3331090002 HH PPS REVENUE DEBIT

## 2022-05-25 NOTE — HOME HEALTH
Skilled reason for visit: [place] discharge without visit.  Information used for this chart was obtained from the last SN visit dated 4/7/22

## 2024-05-28 ENCOUNTER — TRANSCRIBE ORDERS (OUTPATIENT)
Facility: HOSPITAL | Age: 79
End: 2024-05-28

## 2024-05-28 ENCOUNTER — HOSPITAL ENCOUNTER (OUTPATIENT)
Facility: HOSPITAL | Age: 79
Discharge: HOME | End: 2024-05-31
Payer: MEDICARE

## 2024-05-28 DIAGNOSIS — M17.12 OSTEOARTHRITIS OF LEFT KNEE, UNSPECIFIED OSTEOARTHRITIS TYPE: Primary | ICD-10-CM

## 2024-05-28 DIAGNOSIS — M17.12 OSTEOARTHRITIS OF LEFT KNEE, UNSPECIFIED OSTEOARTHRITIS TYPE: ICD-10-CM

## 2024-05-28 LAB
ALBUMIN SERPL-MCNC: 3.2 G/DL (ref 3.4–5)
ALBUMIN/GLOB SERPL: 0.8 (ref 0.8–1.7)
ALP SERPL-CCNC: 147 U/L (ref 45–117)
ALT SERPL-CCNC: 14 U/L (ref 13–56)
ANION GAP SERPL CALC-SCNC: 4 MMOL/L (ref 3–18)
APTT PPP: 51 SEC (ref 23–36.4)
AST SERPL-CCNC: 14 U/L (ref 10–38)
BASOPHILS # BLD: 0 K/UL (ref 0–0.1)
BASOPHILS NFR BLD: 0 % (ref 0–2)
BILIRUB SERPL-MCNC: 0.3 MG/DL (ref 0.2–1)
BUN SERPL-MCNC: 40 MG/DL (ref 7–18)
BUN/CREAT SERPL: 48 (ref 12–20)
CALCIUM SERPL-MCNC: 9 MG/DL (ref 8.5–10.1)
CHLORIDE SERPL-SCNC: 101 MMOL/L (ref 100–111)
CO2 SERPL-SCNC: 31 MMOL/L (ref 21–32)
CREAT SERPL-MCNC: 0.83 MG/DL (ref 0.6–1.3)
DIFFERENTIAL METHOD BLD: ABNORMAL
EKG DIAGNOSIS: NORMAL
EKG Q-T INTERVAL: 394 MS
EKG QRS DURATION: 90 MS
EKG QTC CALCULATION (BAZETT): 489 MS
EKG R AXIS: 82 DEGREES
EKG T AXIS: 98 DEGREES
EKG VENTRICULAR RATE: 93 BPM
EOSINOPHIL # BLD: 0.1 K/UL (ref 0–0.4)
EOSINOPHIL NFR BLD: 1 % (ref 0–5)
ERYTHROCYTE [DISTWIDTH] IN BLOOD BY AUTOMATED COUNT: 13.7 % (ref 11.6–14.5)
EST. AVERAGE GLUCOSE BLD GHB EST-MCNC: 111 MG/DL
GLOBULIN SER CALC-MCNC: 3.8 G/DL (ref 2–4)
GLUCOSE SERPL-MCNC: 125 MG/DL (ref 74–99)
HBA1C MFR BLD: 5.5 % (ref 4.2–5.6)
HCT VFR BLD AUTO: 32.1 % (ref 35–45)
HGB BLD-MCNC: 10 G/DL (ref 12–16)
IMM GRANULOCYTES # BLD AUTO: 0 K/UL (ref 0–0.04)
IMM GRANULOCYTES NFR BLD AUTO: 0 % (ref 0–0.5)
INR PPP: 1.4 (ref 0.9–1.1)
LYMPHOCYTES # BLD: 1.1 K/UL (ref 0.9–3.6)
LYMPHOCYTES NFR BLD: 14 % (ref 21–52)
MCH RBC QN AUTO: 29.4 PG (ref 24–34)
MCHC RBC AUTO-ENTMCNC: 31.2 G/DL (ref 31–37)
MCV RBC AUTO: 94.4 FL (ref 78–100)
MONOCYTES # BLD: 0.9 K/UL (ref 0.05–1.2)
MONOCYTES NFR BLD: 11 % (ref 3–10)
NEUTS SEG # BLD: 5.9 K/UL (ref 1.8–8)
NEUTS SEG NFR BLD: 73 % (ref 40–73)
NRBC # BLD: 0 K/UL (ref 0–0.01)
NRBC BLD-RTO: 0 PER 100 WBC
PLATELET # BLD AUTO: 272 K/UL (ref 135–420)
PMV BLD AUTO: 10.6 FL (ref 9.2–11.8)
POTASSIUM SERPL-SCNC: 3.5 MMOL/L (ref 3.5–5.5)
PROT SERPL-MCNC: 7 G/DL (ref 6.4–8.2)
PROTHROMBIN TIME: 17.5 SEC (ref 11.9–14.7)
RBC # BLD AUTO: 3.4 M/UL (ref 4.2–5.3)
SODIUM SERPL-SCNC: 136 MMOL/L (ref 136–145)
WBC # BLD AUTO: 8 K/UL (ref 4.6–13.2)

## 2024-05-28 PROCEDURE — 80053 COMPREHEN METABOLIC PANEL: CPT

## 2024-05-28 PROCEDURE — 93005 ELECTROCARDIOGRAM TRACING: CPT

## 2024-05-28 PROCEDURE — 83036 HEMOGLOBIN GLYCOSYLATED A1C: CPT

## 2024-05-28 PROCEDURE — 85025 COMPLETE CBC W/AUTO DIFF WBC: CPT

## 2024-05-28 PROCEDURE — 36415 COLL VENOUS BLD VENIPUNCTURE: CPT

## 2024-05-28 PROCEDURE — 85610 PROTHROMBIN TIME: CPT

## 2024-05-28 PROCEDURE — 85730 THROMBOPLASTIN TIME PARTIAL: CPT

## 2024-05-29 LAB
BACTERIA SPEC CULT: NORMAL
BACTERIA SPEC CULT: NORMAL
SERVICE CMNT-IMP: NORMAL

## 2024-05-31 LAB
APPEARANCE UR: CLEAR
BILIRUB UR QL: NEGATIVE
COLOR UR: YELLOW
GLUCOSE UR STRIP.AUTO-MCNC: NEGATIVE MG/DL
HGB UR QL STRIP: NEGATIVE
KETONES UR QL STRIP.AUTO: NEGATIVE MG/DL
LEUKOCYTE ESTERASE UR QL STRIP.AUTO: NEGATIVE
NITRITE UR QL STRIP.AUTO: NEGATIVE
PH UR STRIP: 5.5 (ref 5–8)
PROT UR STRIP-MCNC: NEGATIVE MG/DL
SP GR UR REFRACTOMETRY: 1.01 (ref 1–1.03)
UROBILINOGEN UR QL STRIP.AUTO: 0.2 EU/DL (ref 0.2–1)

## 2024-05-31 PROCEDURE — 81003 URINALYSIS AUTO W/O SCOPE: CPT

## 2024-05-31 PROCEDURE — 87086 URINE CULTURE/COLONY COUNT: CPT

## 2024-06-01 LAB
BACTERIA SPEC CULT: NORMAL
SERVICE CMNT-IMP: NORMAL